# Patient Record
(demographics unavailable — no encounter records)

---

## 2025-02-07 NOTE — ASSESSMENT
[FreeTextEntry1] :  REVIEWED MODERATE TO SEVER SNHL FLUOCINOLON PRN CONTINUE HEARING AIDS F/U 6 MONTHS PRN AVOID Q TIPS

## 2025-07-09 NOTE — DISCUSSION/SUMMARY
[FreeTextEntry1] : Mrs. Baron has a history of hypertension, for which she is presently being managed with medical therapy.  She does not have a known cardiac history.  She reports occasionally experiencing a "gas" sensation while exercising on a treadmill shortly after eating, which is seemingly relieved by stopping exercising and passing a bowel movement.  She is then able to resume exercising without recurrence of this symptom.  She reports having undergone nuclear stress testing in the fall 2024, which was unrevealing to her knowledge.  She has not experienced any symptoms of chest discomfort in association with any of her other activities.  Her cardiac examination today is remarkable for a grade I/VI systolic ejection murmur, in all likelihood representing age-related valvular calcification.  She is exhibiting bilateral 1-2+ minimally-pitting pretibial swelling (which she states is chronic in nature).  Her blood pressure reading today is satisfactory.  Her electrocardiogram today reveals sinus rhythm at a rate of 85 bpm with nonspecific poor R wave progression and mild nonspecific repolarization abnormalities.  I have asked the patient to have copies of the reports of her most recent nuclear stress study and echocardiogram forwarded to my office for my review.  I have reassured the patient today that at least by examination, she does not have evidence for significant aortic stenosis or aortic regurgitation.  In view of her concern of having "an aortic valve problem", I am referring the patient for echocardiography for further evaluation.  As her blood pressure reading today is satisfactory, I have instructed the patient to continue her antihypertensive therapy as presently prescribed for the time being.  I have asked the patient to have a copy of her most recent blood test results forwarded to my office for my review.  The importance of proper dietary habits, proper weight maintenance, and regular exercise was discussed with the patient today.  I have asked the patient to call me if she should have any questions or problems pertaining to these matters, and especially if she should continue to note any sort of chest symptoms while exercising on the treadmill.  I have otherwise asked her to return to the office for follow-up cardiac evaluation and blood pressure reassessment in 4 months, provide she remains clinically stable in the interim. [EKG obtained to assist in diagnosis and management of assessed problem(s)] : EKG obtained to assist in diagnosis and management of assessed problem(s)

## 2025-07-09 NOTE — HISTORY OF PRESENT ILLNESS
[FreeTextEntry1] : Mrs. Jesi Baron presented to the office today for initial cardiology consultation.  She was previously following with another cardiologist, Dr. Shelton Hansen, however, [place], and the patient decided to change cardiologists for geographical reasons.  She was referred here by her urologist, Dr. Vincenzo Bee.  The patient is an 80-year-old female who does not have a known cardiac history.  She has been following with a cardiologist regarding management of hypertension.  In addition, she was told that she has a "mild problem with the aortic valve that is nothing to worry about".  She has a history of hypothyroidism (status post excision of a thyroid tumor), hepatic steatosis, GERD, irritable bowel syndrome, a pancreatic cyst, cholecystectomy, osteopenia, excision of a benign left adrenal tumor, and left breast carcinoma (status post lumpectomy 12/7/2007, followed by treatment with radiation therapy and a 5-year course of Arimidex from 4/2008 through 8/2013).  The patient exercises at a gym 3 times per week.  She has noted that she experiences "gas" in the center of her chest while exercising on the treadmill, which she attributes to having eaten shortly before exercising.  She states that when she develops the sensation, she stops exercising, passes a bowel movement, and is then able to resume exercise without recurrence of this chest sensation.  She was started on pantoprazole approximately 1 month ago, and thinks that she has noted improvement in this symptom.  She has not experienced this "gas" sensation in her chest in association with any of her other activities.  She has not noted dyspnea on exertion in association with activities.  She has not noted experiencing orthopnea or paroxysmal nocturnal dyspnea.  She reports having chronic bilateral lower extremity swelling, without any recent change in the degree.  She has not experienced any episodes of palpitations, presyncope or syncope.  The patient reports having undergone nuclear stress testing in the fall 2024, and to her knowledge, this study was unremarkable.  She also reports having undergone echocardiography in the fall 2024, and although she states that her cardiologist informed her that there were no concerning findings, she states that "there was something with the aortic valve".  As far as risk factors for coronary artery disease are concerned, the patient has a history of hypertension.  She denies having a dyslipidemia.  She describes having pre-diabetes.  She does not have a known immediate family history of premature coronary artery disease.

## 2025-07-09 NOTE — CARDIOLOGY SUMMARY
[de-identified] : 7/9/2025-sinus rhythm at a rate of 85 bpm.  Nonspecific poor R wave progression.  Nonspecific repolarization abnormalities.

## 2025-07-09 NOTE — PHYSICAL EXAM
[No Acute Distress] : no acute distress [Normal Conjunctiva] : normal conjunctiva [No Carotid Bruit] : no carotid bruit [Normal S1, S2] : normal S1, S2 [No Gallop] : no gallop [Murmur] : murmur [Clear Lung Fields] : clear lung fields [No Respiratory Distress] : no respiratory distress  [Soft] : abdomen soft [Non Tender] : non-tender [Normal Gait] : normal gait [Edema ___] : edema [unfilled] [No Rash] : no rash [Moves all extremities] : moves all extremities [Alert and Oriented] : alert and oriented [de-identified] : Moderately overweight white female [de-identified] : No JVD is appreciated at a 45 degree angle [de-identified] : I/VI systolic ejection murmur